# Patient Record
(demographics unavailable — no encounter records)

---

## 2024-11-17 NOTE — PLAN
[FreeTextEntry1] : 51 yo for annual visit.   HCM -pap smear today -rx mammo and sono -colonoscopy up to date -rto 1 year